# Patient Record
Sex: FEMALE | Race: WHITE | NOT HISPANIC OR LATINO | Employment: OTHER | URBAN - METROPOLITAN AREA
[De-identification: names, ages, dates, MRNs, and addresses within clinical notes are randomized per-mention and may not be internally consistent; named-entity substitution may affect disease eponyms.]

---

## 2023-11-16 DIAGNOSIS — I47.10 SUPRAVENTRICULAR TACHYCARDIA (CMS-HCC): Primary | ICD-10-CM

## 2023-11-16 RX ORDER — METOPROLOL SUCCINATE 25 MG/1
25 TABLET, EXTENDED RELEASE ORAL DAILY
Qty: 90 TABLET | Refills: 3 | Status: SHIPPED | OUTPATIENT
Start: 2023-11-16 | End: 2024-11-15

## 2023-11-16 RX ORDER — METOPROLOL SUCCINATE 25 MG/1
25 TABLET, EXTENDED RELEASE ORAL DAILY
COMMUNITY
End: 2023-11-16 | Stop reason: SDUPTHER

## 2024-03-01 PROBLEM — K21.00 GASTRO-ESOPHAGEAL REFLUX DISEASE WITH ESOPHAGITIS: Status: ACTIVE | Noted: 2017-04-15

## 2024-03-01 PROBLEM — K21.9 GASTROESOPHAGEAL REFLUX DISEASE WITHOUT ESOPHAGITIS: Status: ACTIVE | Noted: 2023-12-18

## 2024-03-01 PROBLEM — E11.9 TYPE 2 DIABETES MELLITUS WITHOUT COMPLICATION, WITHOUT LONG-TERM CURRENT USE OF INSULIN (MULTI): Status: ACTIVE | Noted: 2024-03-01

## 2024-03-01 PROBLEM — R91.1 LUNG NODULE SEEN ON IMAGING STUDY: Status: ACTIVE | Noted: 2017-03-07

## 2024-03-01 PROBLEM — N95.9 POST MENOPAUSAL PROBLEMS: Status: ACTIVE | Noted: 2024-03-01

## 2024-03-01 PROBLEM — B37.2 CANDIDIASIS OF SKIN: Status: ACTIVE | Noted: 2023-12-18

## 2024-03-01 PROBLEM — E11.65 HYPERGLYCEMIA DUE TO TYPE 2 DIABETES MELLITUS (MULTI): Status: ACTIVE | Noted: 2024-03-01

## 2024-03-01 PROBLEM — G47.00 INSOMNIA: Status: ACTIVE | Noted: 2024-03-01

## 2024-03-01 PROBLEM — I51.7 LEFT ATRIAL DILATATION: Status: ACTIVE | Noted: 2017-01-23

## 2024-03-01 PROBLEM — F43.22 ADJUSTMENT REACTION WITH ANXIOUS MOOD: Status: ACTIVE | Noted: 2024-03-01

## 2024-03-01 PROBLEM — K31.7 GASTRIC POLYPS: Status: ACTIVE | Noted: 2024-03-01

## 2024-03-01 PROBLEM — F41.0 PANIC DISORDER: Status: ACTIVE | Noted: 2024-03-01

## 2024-03-01 PROBLEM — E78.00 HYPERCHOLESTEROLEMIA: Status: ACTIVE | Noted: 2023-02-24

## 2024-03-01 PROBLEM — F32.A DEPRESSIVE DISORDER: Status: ACTIVE | Noted: 2023-12-18

## 2024-03-01 PROBLEM — R53.1 GENERALIZED WEAKNESS: Status: ACTIVE | Noted: 2024-03-01

## 2024-03-01 PROBLEM — I47.10 PAROXYSMAL SUPRAVENTRICULAR TACHYCARDIA (CMS-HCC): Status: ACTIVE | Noted: 2017-01-23

## 2024-03-01 PROBLEM — F41.9 ANXIETY: Status: ACTIVE | Noted: 2024-03-01

## 2024-03-01 PROBLEM — R94.39 ABNORMAL NUCLEAR STRESS TEST: Status: ACTIVE | Noted: 2017-02-13

## 2024-03-01 PROBLEM — I95.9 LOW BLOOD PRESSURE: Status: ACTIVE | Noted: 2024-03-01

## 2024-03-01 PROBLEM — E66.9 OBESITY, CLASS II, BMI 35-39.9: Status: ACTIVE | Noted: 2023-06-14

## 2024-03-01 PROBLEM — K59.00 CONSTIPATION: Status: ACTIVE | Noted: 2024-03-01

## 2024-03-01 PROBLEM — K44.9 HIATAL HERNIA: Status: ACTIVE | Noted: 2024-03-01

## 2024-03-01 PROBLEM — R01.1 HEART MURMUR: Status: ACTIVE | Noted: 2023-12-18

## 2024-03-01 PROBLEM — E66.812 OBESITY, CLASS II, BMI 35-39.9: Status: ACTIVE | Noted: 2023-06-14

## 2024-03-01 RX ORDER — TRAZODONE HYDROCHLORIDE 150 MG/1
150 TABLET ORAL NIGHTLY
COMMUNITY
Start: 2023-09-05

## 2024-03-01 RX ORDER — GLIPIZIDE 10 MG/1
10 TABLET, FILM COATED, EXTENDED RELEASE ORAL 2 TIMES DAILY
COMMUNITY
Start: 2023-11-05

## 2024-03-01 RX ORDER — PAROXETINE HYDROCHLORIDE 20 MG/1
20 TABLET, FILM COATED ORAL DAILY
COMMUNITY
Start: 2023-11-06

## 2024-03-01 RX ORDER — METFORMIN HYDROCHLORIDE 1000 MG/1
1000 TABLET ORAL
COMMUNITY
Start: 2023-11-05

## 2024-03-01 RX ORDER — METHOCARBAMOL 500 MG/1
TABLET, FILM COATED ORAL
COMMUNITY
Start: 2023-10-12 | End: 2024-03-04 | Stop reason: ALTCHOICE

## 2024-03-01 RX ORDER — ASPIRIN 81 MG/1
81 TABLET ORAL DAILY
COMMUNITY
Start: 2019-02-18

## 2024-03-01 RX ORDER — ATORVASTATIN CALCIUM 40 MG/1
40 TABLET, FILM COATED ORAL EVERY EVENING
COMMUNITY
Start: 2023-10-09

## 2024-03-01 RX ORDER — OMEPRAZOLE 20 MG/1
20 CAPSULE, DELAYED RELEASE ORAL DAILY
COMMUNITY
Start: 2023-08-22

## 2024-03-01 RX ORDER — ORAL SEMAGLUTIDE 3 MG/1
TABLET ORAL
COMMUNITY
Start: 2023-07-28 | End: 2024-03-04 | Stop reason: ALTCHOICE

## 2024-03-01 RX ORDER — SEMAGLUTIDE 0.68 MG/ML
INJECTION, SOLUTION SUBCUTANEOUS
COMMUNITY
Start: 2023-06-14

## 2024-03-01 RX ORDER — PIOGLITAZONEHYDROCHLORIDE 30 MG/1
30 TABLET ORAL DAILY
COMMUNITY
Start: 2023-11-11

## 2024-03-01 RX ORDER — FLAXSEED OIL 1000 MG
1 CAPSULE ORAL EVERY 12 HOURS
COMMUNITY
End: 2024-03-04 | Stop reason: ALTCHOICE

## 2024-03-04 ENCOUNTER — OFFICE VISIT (OUTPATIENT)
Dept: CARDIOLOGY | Facility: CLINIC | Age: 74
End: 2024-03-04
Payer: MEDICARE

## 2024-03-04 VITALS
BODY MASS INDEX: 36.14 KG/M2 | WEIGHT: 204 LBS | HEIGHT: 63 IN | HEART RATE: 57 BPM | OXYGEN SATURATION: 98 % | DIASTOLIC BLOOD PRESSURE: 65 MMHG | SYSTOLIC BLOOD PRESSURE: 111 MMHG

## 2024-03-04 DIAGNOSIS — I25.10 CORONARY ARTERY DISEASE INVOLVING NATIVE CORONARY ARTERY OF NATIVE HEART WITHOUT ANGINA PECTORIS: Primary | ICD-10-CM

## 2024-03-04 PROCEDURE — 1159F MED LIST DOCD IN RCRD: CPT | Performed by: NURSE PRACTITIONER

## 2024-03-04 PROCEDURE — 3074F SYST BP LT 130 MM HG: CPT | Performed by: NURSE PRACTITIONER

## 2024-03-04 PROCEDURE — 3078F DIAST BP <80 MM HG: CPT | Performed by: NURSE PRACTITIONER

## 2024-03-04 PROCEDURE — 1036F TOBACCO NON-USER: CPT | Performed by: NURSE PRACTITIONER

## 2024-03-04 PROCEDURE — 1126F AMNT PAIN NOTED NONE PRSNT: CPT | Performed by: NURSE PRACTITIONER

## 2024-03-04 PROCEDURE — 1160F RVW MEDS BY RX/DR IN RCRD: CPT | Performed by: NURSE PRACTITIONER

## 2024-03-04 PROCEDURE — 99214 OFFICE O/P EST MOD 30 MIN: CPT | Performed by: NURSE PRACTITIONER

## 2024-03-04 RX ORDER — NYSTATIN 100000 [USP'U]/G
1 POWDER TOPICAL
COMMUNITY

## 2024-03-04 ASSESSMENT — LIFESTYLE VARIABLES
HOW OFTEN DO YOU HAVE A DRINK CONTAINING ALCOHOL: NEVER
HOW MANY STANDARD DRINKS CONTAINING ALCOHOL DO YOU HAVE ON A TYPICAL DAY: PATIENT DOES NOT DRINK
AUDIT-C TOTAL SCORE: 0
SKIP TO QUESTIONS 9-10: 1
HOW OFTEN DO YOU HAVE SIX OR MORE DRINKS ON ONE OCCASION: NEVER

## 2024-03-04 ASSESSMENT — ENCOUNTER SYMPTOMS
DEPRESSION: 0
LOSS OF SENSATION IN FEET: 0
GASTROINTESTINAL NEGATIVE: 1
NEUROLOGICAL NEGATIVE: 1
OCCASIONAL FEELINGS OF UNSTEADINESS: 0
MUSCULOSKELETAL NEGATIVE: 1
CONSTITUTIONAL NEGATIVE: 1
RESPIRATORY NEGATIVE: 1
CARDIOVASCULAR NEGATIVE: 1

## 2024-03-04 ASSESSMENT — PAIN SCALES - GENERAL: PAINLEVEL: 0-NO PAIN

## 2024-03-04 NOTE — PROGRESS NOTES
"Chief Complaint:   Follow-up (1yr)    History Of Present Illness:    .Ms Reis returns in follow up.  Denies chest pain, sob, palpitations or pedal edema.           Last Recorded Vitals:  Blood pressure 111/65, pulse 57, height 1.6 m (5' 3\"), weight 92.5 kg (204 lb), SpO2 98 %.     Past Medical History:  Past Medical History:   Diagnosis Date    Encounter for general adult medical examination without abnormal findings 12/15/2017    Welcome to Medicare preventive visit    Other specified postprocedural states 12/15/2017    History of esophageal dilatation    Personal history of other diseases of the respiratory system 2018    History of acute bronchitis    Personal history of other endocrine, nutritional and metabolic disease 10/15/2019    History of obesity        Past Surgical History:  Past Surgical History:   Procedure Laterality Date     SECTION, CLASSIC  12/15/2017     Section    ESOPHAGOGASTRODUODENOSCOPY  12/15/2017    Diagnostic Esophagogastroduodenoscopy    GALLBLADDER SURGERY  12/15/2017    Gallbladder Surgery       Social History:  Social History     Socioeconomic History    Marital status:      Spouse name: None    Number of children: None    Years of education: None    Highest education level: None   Occupational History    None   Tobacco Use    Smoking status: Former     Types: Cigarettes     Quit date: 1974     Years since quittin.2    Smokeless tobacco: Never   Substance and Sexual Activity    Alcohol use: Never    Drug use: Never    Sexual activity: None   Other Topics Concern    None   Social History Narrative    None     Social Determinants of Health     Financial Resource Strain: Not on file   Food Insecurity: Not on file   Transportation Needs: Not on file   Physical Activity: Not on file   Stress: Not on file   Social Connections: Not on file   Intimate Partner Violence: Not on file   Housing Stability: Not on file       Family History:  No family history on " file.      Allergies:  Patient has no known allergies.    Outpatient Medications:  Current Outpatient Medications   Medication Sig Dispense Refill    aspirin 81 mg EC tablet Take 1 tablet (81 mg) by mouth once daily.      atorvastatin (Lipitor) 40 mg tablet Take 1 tablet (40 mg) by mouth once daily in the evening.      blood sugar diagnostic strip Use as instructed      glipiZIDE XL (Glucotrol XL) 10 mg 24 hr tablet Take 1 tablet (10 mg) by mouth 2 times a day.      metFORMIN (Glucophage) 1,000 mg tablet Take 1 tablet (1,000 mg) by mouth 2 times a day with meals.      metoprolol succinate XL (Toprol-XL) 25 mg 24 hr tablet Take 1 tablet (25 mg) by mouth once daily. Do not crush or chew. 90 tablet 3    nystatin (Mycostatin) 100,000 unit/gram powder Apply 1 Application topically.      omeprazole (PriLOSEC) 20 mg DR capsule Take 1 capsule (20 mg) by mouth once daily.      Ozempic 0.25 mg or 0.5 mg (2 mg/3 mL) pen injector INJECT 0.25 MG SUBCUTANEOUSLY 1 TIME A WEEK      PARoxetine (Paxil) 20 mg tablet Take 1 tablet (20 mg) by mouth once daily.      pioglitazone (Actos) 30 mg tablet Take 1 tablet (30 mg) by mouth once daily.      traZODone (Desyrel) 150 mg tablet Take 1 tablet (150 mg) by mouth once daily at bedtime.       No current facility-administered medications for this visit.        Physical Exam:  Cardiovascular:      PMI at left midclavicular line. Normal rate. Regular rhythm. Normal S1. Normal S2.       Murmurs: There is no murmur.      No gallop.  No click. No rub.   Pulses:     Intact distal pulses.   Edema:     Peripheral edema absent.         ROS:  Review of Systems   Constitutional: Negative.   Cardiovascular: Negative.    Respiratory: Negative.     Skin: Negative.    Musculoskeletal: Negative.    Gastrointestinal: Negative.    Genitourinary: Negative.    Neurological: Negative.           Last Labs:  CBC -  Lab Results   Component Value Date    WBC 7.3 01/06/2020    HGB 12.8 01/06/2020    HCT 40.8  01/06/2020    MCV 92 01/06/2020     01/06/2020       CMP -  Lab Results   Component Value Date    CALCIUM 8.6 07/21/2021    PROT 6.4 07/21/2021    ALBUMIN 4.2 07/21/2021    AST 22 07/21/2021    ALT 21 07/21/2021    ALKPHOS 80 07/21/2021    BILITOT 0.4 07/21/2021       LIPID PANEL -   Lab Results   Component Value Date    CHOL 133 07/21/2021    TRIG 183 (H) 01/06/2020    HDL 39.8 (A) 07/21/2021    CHHDL 3.3 07/21/2021    LDLF 63 01/06/2020    VLDL 37 01/06/2020       RENAL FUNCTION PANEL -   Lab Results   Component Value Date    GLUCOSE 188 (H) 07/21/2021     07/21/2021    K 4.2 07/21/2021     07/21/2021    CO2 26 07/21/2021    ANIONGAP 14 07/21/2021    BUN 11 07/21/2021    CREATININE 0.87 07/21/2021    CALCIUM 8.6 07/21/2021    ALBUMIN 4.2 07/21/2021        Lab Results   Component Value Date    HGBA1C 7.4 (H) 12/13/2023         Assessment/Plan   Problem List Items Addressed This Visit    None    Impressions     Assessment:     1. Paroxysmal SVT. This patient approximately 3 years ago had an episode where she was unable to catch her breath and had been difficulty even walking within the house. Her  intended on driving her to the emergency room but actually stopped at the fire department in [place]. She was taken to [facility] and was admitted between 01/07/2017â€“01/09/2017 for SVT and a heart rate of 220. The patient was started on metoprolol at that time. She did have a echocardiogram performed on 1/9/2017 demonstrating a preserved LV ejection fraction at 60-65% with mild left atrial enlargement. Her primary care physician subsequently reduced the dose by half due to a low heart rate approximately 1-2 years ago. She did have an echocardiogram performed during the admission on 01/09/2017 which estimated the LV ejection fraction at 60â€“65 percent with mild left atrial enlargement and trace mitral valve regurgitation. The patient has been noted doing reasonably well on current  management of metoprolol 25 mg one half tablet twice a day. For the sake of convenience she will be switched to metoprolol extended release 25 mg daily. ECG done prior showed sinus bradycardia.  2023  CONCLUSIONS:  1. Left ventricular systolic function is normal with a 60-65% estimated ejection fraction.  2. The left atrium is moderately dilated.  3. Mild mitral valve regurgitation.     2. Type 2 diabetes. The patient has had type 2 diabetes for approximately 20 years. She is currently on pioglitazone 30 mg daily, metformin 1000 mg twice a day plus glipizide 10 mg daily. The patient's glycohemoglobin was 7. 7% when checked on 2020 at which point her glipizide was increased from 5 to 10 mg daily. Her electrolyte panel at that time included a glucose of 242. Recent A1C 7.4%.      3. Hyperlipidemia. Good response to atorvastatin 40 mg daily. Lipid panel on 2022 included cholesterol 141 LDL 69 HDL 45 triglyceride 136.     4. Coronary artery disease. The patient does have risk factors including the diabetes and hyperlipidemia. Her brother evidently  of malnutrition having had a history of alcoholism and even an esophageal rupture. The patient will be scheduled for a CT coronary calcium score which was 41.33 when done 2020.     5. Hx of covid-19 vaccines.       SALONI Weems-CNP

## 2024-09-09 ENCOUNTER — APPOINTMENT (OUTPATIENT)
Dept: CARDIOLOGY | Facility: CLINIC | Age: 74
End: 2024-09-09
Payer: MEDICARE

## 2024-10-07 ENCOUNTER — OFFICE VISIT (OUTPATIENT)
Dept: CARDIOLOGY | Facility: CLINIC | Age: 74
End: 2024-10-07
Payer: MEDICARE

## 2024-10-07 VITALS
HEART RATE: 50 BPM | HEIGHT: 63 IN | SYSTOLIC BLOOD PRESSURE: 108 MMHG | DIASTOLIC BLOOD PRESSURE: 63 MMHG | WEIGHT: 197 LBS | BODY MASS INDEX: 34.91 KG/M2 | OXYGEN SATURATION: 95 %

## 2024-10-07 DIAGNOSIS — I47.10 SVT (SUPRAVENTRICULAR TACHYCARDIA) (CMS-HCC): Primary | ICD-10-CM

## 2024-10-07 PROCEDURE — 1159F MED LIST DOCD IN RCRD: CPT | Performed by: NURSE PRACTITIONER

## 2024-10-07 PROCEDURE — 1126F AMNT PAIN NOTED NONE PRSNT: CPT | Performed by: NURSE PRACTITIONER

## 2024-10-07 PROCEDURE — 3008F BODY MASS INDEX DOCD: CPT | Performed by: NURSE PRACTITIONER

## 2024-10-07 PROCEDURE — 1160F RVW MEDS BY RX/DR IN RCRD: CPT | Performed by: NURSE PRACTITIONER

## 2024-10-07 PROCEDURE — 99214 OFFICE O/P EST MOD 30 MIN: CPT | Performed by: NURSE PRACTITIONER

## 2024-10-07 PROCEDURE — 3078F DIAST BP <80 MM HG: CPT | Performed by: NURSE PRACTITIONER

## 2024-10-07 PROCEDURE — 3074F SYST BP LT 130 MM HG: CPT | Performed by: NURSE PRACTITIONER

## 2024-10-07 PROCEDURE — 1036F TOBACCO NON-USER: CPT | Performed by: NURSE PRACTITIONER

## 2024-10-07 ASSESSMENT — ENCOUNTER SYMPTOMS
DEPRESSION: 0
RESPIRATORY NEGATIVE: 1
CARDIOVASCULAR NEGATIVE: 1
GASTROINTESTINAL NEGATIVE: 1
OCCASIONAL FEELINGS OF UNSTEADINESS: 0
CONSTITUTIONAL NEGATIVE: 1
LOSS OF SENSATION IN FEET: 0
MUSCULOSKELETAL NEGATIVE: 1
NEUROLOGICAL NEGATIVE: 1

## 2024-10-07 ASSESSMENT — PAIN SCALES - GENERAL: PAINLEVEL: 0-NO PAIN

## 2024-10-07 NOTE — PROGRESS NOTES
"Chief Complaint:   Follow up    History Of Present Illness:    .Ms Reis returns in follow up.  Denies chest pain, sob, or pedal edema.   She had a 40 minute episode of palpitations.  Has them every month, usually for about ten minutes.  Has been referred to EP.  Feels she may have neuropathy.  Will discuss with pcp in a few weeks.  May see endo.           Last Recorded Vitals:  Blood pressure 108/63, pulse 50, height 1.6 m (5' 3\"), weight 89.4 kg (197 lb), SpO2 95%.     Past Medical History:  Past Medical History:   Diagnosis Date    Encounter for general adult medical examination without abnormal findings 12/15/2017    Welcome to Medicare preventive visit    Other specified postprocedural states 12/15/2017    History of esophageal dilatation    Personal history of other diseases of the respiratory system 2018    History of acute bronchitis    Personal history of other endocrine, nutritional and metabolic disease 10/15/2019    History of obesity        Past Surgical History:  Past Surgical History:   Procedure Laterality Date     SECTION, CLASSIC  12/15/2017     Section    ESOPHAGOGASTRODUODENOSCOPY  12/15/2017    Diagnostic Esophagogastroduodenoscopy    GALLBLADDER SURGERY  12/15/2017    Gallbladder Surgery       Social History:  Social History     Socioeconomic History    Marital status:    Tobacco Use    Smoking status: Former     Current packs/day: 0.00     Types: Cigarettes     Quit date: 1974     Years since quittin.8    Smokeless tobacco: Never   Substance and Sexual Activity    Alcohol use: Never    Drug use: Never     Social Determinants of Health     Financial Resource Strain: Low Risk  (2024)    Received from [facility]    Overall Financial Resource Strain (CARDIA)     Difficulty of Paying Living Expenses: Not hard at all   Food Insecurity: No Food Insecurity (2024)    Received from [facility]    Hunger Vital Sign     Worried About Running Out of " Food in the Last Year: Never true     Ran Out of Food in the Last Year: Never true   Transportation Needs: No Transportation Needs (8/14/2024)    Received from [facility]    PRAPARE - Transportation     Lack of Transportation (Medical): No     Lack of Transportation (Non-Medical): No   Physical Activity: Inactive (8/14/2024)    Received from [facility]    Exercise Vital Sign     Days of Exercise per Week: 0 days     Minutes of Exercise per Session: 0 min   Stress: No Stress Concern Present (8/14/2024)    Received from [facility]    [employer] [place] [employer] - Occupational Stress Questionnaire     Feeling of Stress : Only a little   Social Connections: Moderately Integrated (8/14/2024)    Received from [facility]    Social Connection and Isolation Panel [NHANES]     Frequency of Communication with Friends and Family: More than three times a week     Frequency of Social Gatherings with Friends and Family: Twice a week     Attends Jew Services: 1 to 4 times per year     Active Member of Clubs or Organizations: No     Attends Club or Organization Meetings: Never     Marital Status:    Housing Stability: Low Risk  (6/7/2023)    Received from [facility], [facility]    Housing Stability Vital Sign     Unable to Pay for Housing in the Last Year: No     Number of Places Lived in the Last Year: 1     Unstable Housing in the Last Year: No       Family History:  No family history on file.      Allergies:  Patient has no known allergies.    Outpatient Medications:  Current Outpatient Medications   Medication Sig Dispense Refill    aspirin 81 mg EC tablet Take 1 tablet (81 mg) by mouth once daily.      atorvastatin (Lipitor) 40 mg tablet Take 1 tablet (40 mg) by mouth once daily in the evening.      blood sugar diagnostic strip Use as instructed      glipiZIDE XL (Glucotrol XL) 10 mg 24 hr tablet Take 1 tablet (10 mg) by mouth 2 times a day.      metFORMIN  (Glucophage) 1,000 mg tablet Take 1 tablet (1,000 mg) by mouth 2 times daily (morning and late afternoon).      metoprolol succinate XL (Toprol-XL) 25 mg 24 hr tablet Take 1 tablet (25 mg) by mouth once daily. Do not crush or chew. 90 tablet 3    nystatin (Mycostatin) 100,000 unit/gram powder Apply 1 Application topically.      omeprazole (PriLOSEC) 20 mg DR capsule Take 1 capsule (20 mg) by mouth once daily.      Ozempic 0.25 mg or 0.5 mg (2 mg/3 mL) pen injector INJECT 0.25 MG SUBCUTANEOUSLY 1 TIME A WEEK      PARoxetine (Paxil) 20 mg tablet Take 1 tablet (20 mg) by mouth once daily.      pioglitazone (Actos) 30 mg tablet Take 1 tablet (30 mg) by mouth once daily.      traZODone (Desyrel) 150 mg tablet Take 1 tablet (150 mg) by mouth once daily at bedtime.       No current facility-administered medications for this visit.        Physical Exam:  Cardiovascular:      PMI at left midclavicular line. Normal rate. Regular rhythm. Normal S1. Normal S2.       Murmurs: There is no murmur.      No gallop.  No click. No rub.   Pulses:     Intact distal pulses.   Edema:     Peripheral edema absent.         ROS:  Review of Systems   Constitutional: Negative.   Cardiovascular: Negative.    Respiratory: Negative.     Skin: Negative.    Musculoskeletal: Negative.    Gastrointestinal: Negative.    Genitourinary: Negative.    Neurological: Negative.           Last Labs:  CBC -  Lab Results   Component Value Date    WBC 7.3 01/06/2020    HGB 12.8 01/06/2020    HCT 40.8 01/06/2020    MCV 92 01/06/2020     01/06/2020       CMP -  Lab Results   Component Value Date    CALCIUM 8.6 07/21/2021    PROT 6.4 07/21/2021    ALBUMIN 4.2 07/21/2021    AST 22 07/21/2021    ALT 21 07/21/2021    ALKPHOS 80 07/21/2021    BILITOT 0.4 07/21/2021       LIPID PANEL -   Lab Results   Component Value Date    CHOL 133 07/21/2021    TRIG 183 (H) 01/06/2020    HDL 39.8 (A) 07/21/2021    CHHDL 3.3 07/21/2021    LDLF 63 01/06/2020    VLDL 37 01/06/2020        RENAL FUNCTION PANEL -   Lab Results   Component Value Date    GLUCOSE 188 (H) 07/21/2021     07/21/2021    K 4.2 07/21/2021     07/21/2021    CO2 26 07/21/2021    ANIONGAP 14 07/21/2021    BUN 11 07/21/2021    CREATININE 0.87 07/21/2021    CALCIUM 8.6 07/21/2021    ALBUMIN 4.2 07/21/2021        Lab Results   Component Value Date    HGBA1C 7.2 (H) 06/18/2024         Assessment/Plan   Problem List Items Addressed This Visit    None    Assessment:     1. Paroxysmal SVT. This patient approximately 3 years ago had an episode where she was unable to catch her breath and had been difficulty even walking within the house. Her  intended on driving her to the emergency room but actually stopped at the fire department in [place]. She was taken to [facility] and was admitted between 01/07/2017â€“01/09/2017 for SVT and a heart rate of 220. The patient was started on metoprolol at that time. She did have a echocardiogram performed on 1/9/2017 demonstrating a preserved LV ejection fraction at 60-65% with mild left atrial enlargement. Her primary care physician subsequently reduced the dose by half due to a low heart rate approximately 1-2 years ago. She did have an echocardiogram performed during the admission on 01/09/2017 which estimated the LV ejection fraction at 60â€“65 percent with mild left atrial enlargement and trace mitral valve regurgitation. The patient has been noted doing reasonably well on current management of metoprolol 25 mg one half tablet twice a day. For the sake of convenience she will be switched to metoprolol extended release 25 mg daily. ECG done prior showed sinus bradycardia.  02/2023  CONCLUSIONS:  1. Left ventricular systolic function is normal with a 60-65% estimated ejection fraction.  2. The left atrium is moderately dilated.  3. Mild mitral valve regurgitation.     2. Type 2 diabetes. The patient has had type 2 diabetes for approximately 20 years. She is currently on  pioglitazone 30 mg daily, metformin 1000 mg twice a day plus glipizide 10 mg daily. The patient's glycohemoglobin was 7. 7% when checked on 2020 at which point her glipizide was increased from 5 to 10 mg daily. Her electrolyte panel at that time included a glucose of 242. Recent A1C 7.4%.      3. Hyperlipidemia. Good response to atorvastatin 40 mg daily. Lipid panel on 2022 included cholesterol 141 LDL 69 HDL 45 triglyceride 136.     4. Coronary artery disease. The patient does have risk factors including the diabetes and hyperlipidemia. Her brother evidently  of malnutrition having had a history of alcoholism and even an esophageal rupture. The patient will be scheduled for a CT coronary calcium score which was 41.33 when done 2020.     5. Hx of covid-19 vaccines.     6.  Mild CKD.          Temitope Gillette, SALONI-CNP